# Patient Record
Sex: FEMALE | Race: WHITE | Employment: FULL TIME | URBAN - METROPOLITAN AREA
[De-identification: names, ages, dates, MRNs, and addresses within clinical notes are randomized per-mention and may not be internally consistent; named-entity substitution may affect disease eponyms.]

---

## 2017-06-16 ENCOUNTER — OFFICE VISIT (OUTPATIENT)
Dept: FAMILY MEDICINE CLINIC | Facility: CLINIC | Age: 32
End: 2017-06-16

## 2017-06-16 ENCOUNTER — APPOINTMENT (OUTPATIENT)
Dept: LAB | Age: 32
End: 2017-06-16
Attending: NURSE PRACTITIONER
Payer: COMMERCIAL

## 2017-06-16 VITALS
HEART RATE: 89 BPM | TEMPERATURE: 97 F | WEIGHT: 211.38 LBS | DIASTOLIC BLOOD PRESSURE: 88 MMHG | SYSTOLIC BLOOD PRESSURE: 124 MMHG

## 2017-06-16 DIAGNOSIS — Z12.4 SCREENING FOR MALIGNANT NEOPLASM OF CERVIX: ICD-10-CM

## 2017-06-16 DIAGNOSIS — Z01.411 ENCOUNTER FOR GYNECOLOGICAL EXAMINATION WITH ABNORMAL FINDING: ICD-10-CM

## 2017-06-16 DIAGNOSIS — Z11.51 SPECIAL SCREENING EXAMINATION FOR HUMAN PAPILLOMAVIRUS (HPV): ICD-10-CM

## 2017-06-16 DIAGNOSIS — G62.9 NEUROPATHY: ICD-10-CM

## 2017-06-16 DIAGNOSIS — Z00.00 ROUTINE GENERAL MEDICAL EXAMINATION AT A HEALTH CARE FACILITY: Primary | ICD-10-CM

## 2017-06-16 PROCEDURE — 88175 CYTOPATH C/V AUTO FLUID REDO: CPT | Performed by: NURSE PRACTITIONER

## 2017-06-16 PROCEDURE — 99395 PREV VISIT EST AGE 18-39: CPT | Performed by: NURSE PRACTITIONER

## 2017-06-16 PROCEDURE — 82607 VITAMIN B-12: CPT

## 2017-06-16 PROCEDURE — 36415 COLL VENOUS BLD VENIPUNCTURE: CPT

## 2017-06-16 PROCEDURE — 80053 COMPREHEN METABOLIC PANEL: CPT

## 2017-06-16 RX ORDER — NORETHINDRONE ACETATE AND ETHINYL ESTRADIOL 1MG-20(21)
1 KIT ORAL DAILY
Qty: 1 PACKAGE | Refills: 12 | Status: SHIPPED | OUTPATIENT
Start: 2017-06-16 | End: 2017-07-26 | Stop reason: ALTCHOICE

## 2017-06-16 NOTE — PROGRESS NOTES
HPI:    Patient ID: Trudi Macdonald is a 36year old female. HPI patient presents today for her annual physical.  States that she had been on the birth control pill, but while she was on Rituxan for her ITP her blood pressure went up.   Because she was a Allergic/Immunologic: Negative. Neurological:        See HPI   Hematological:        See HPI   Psychiatric/Behavioral: Negative.                Current Outpatient Prescriptions:  Norethin Ace-Eth Estrad-FE 1-20 MG-MCG Oral Tab Take 1 tablet by mouth dorie Abdominal: Soft. Normal appearance and bowel sounds are normal. She exhibits no mass. There is no hepatosplenomegaly. There is no tenderness. There is no rebound and no guarding. No hernia.    Genitourinary: Vagina normal and uterus normal. No breast swelli Vitamin B12 [E]  ThinPrep PAP Smear    Meds This Visit:  Signed Prescriptions Disp Refills    Norethin Ace-Eth Estrad-FE 1-20 MG-MCG Oral Tab 1 Package 12      Sig: Take 1 tablet by mouth daily.            Imaging & Referrals:  None      Patient Instruction

## 2017-06-17 ENCOUNTER — TELEPHONE (OUTPATIENT)
Dept: FAMILY MEDICINE CLINIC | Facility: CLINIC | Age: 32
End: 2017-06-17

## 2017-06-17 NOTE — TELEPHONE ENCOUNTER
----- Message from CRUZ Mims sent at 6/16/2017 10:24 PM CDT -----  Please notify patient that her blood work is normal.  Follow-up if symptoms persist or increase

## 2017-07-26 ENCOUNTER — TELEPHONE (OUTPATIENT)
Dept: FAMILY MEDICINE CLINIC | Facility: CLINIC | Age: 32
End: 2017-07-26

## 2017-07-26 NOTE — TELEPHONE ENCOUNTER
Patient informed of recommendations. Expressed understanding. Patient states she always takes the bcp at the same exact time everyday nor has she missed any.    Patient infomred of bcp sent to pharmacy

## 2017-07-26 NOTE — TELEPHONE ENCOUNTER
Patient wants to change dosage on her bcp. Patient states she is has been having light spotting all month on her current dose of bcp. Patient states this usually happens and usually needs to change the dose to work.  Patient states she is supposed to start

## 2017-07-26 NOTE — TELEPHONE ENCOUNTER
I can increase the estrogen component of her birth control, however please make sure that she is taking it at the exact same time every day and that she has not missed any or taken any late.   Also if she has had any diarrhea or vomiting that could affect a

## 2018-04-13 ENCOUNTER — OFFICE VISIT (OUTPATIENT)
Dept: FAMILY MEDICINE CLINIC | Facility: CLINIC | Age: 33
End: 2018-04-13

## 2018-04-13 VITALS
HEART RATE: 98 BPM | SYSTOLIC BLOOD PRESSURE: 122 MMHG | TEMPERATURE: 98 F | OXYGEN SATURATION: 98 % | DIASTOLIC BLOOD PRESSURE: 88 MMHG | WEIGHT: 230.63 LBS

## 2018-04-13 DIAGNOSIS — L02.91 ABSCESS: Primary | ICD-10-CM

## 2018-04-13 PROCEDURE — 99213 OFFICE O/P EST LOW 20 MIN: CPT | Performed by: NURSE PRACTITIONER

## 2018-04-13 RX ORDER — SULFAMETHOXAZOLE AND TRIMETHOPRIM 800; 160 MG/1; MG/1
1 TABLET ORAL 2 TIMES DAILY
Qty: 14 TABLET | Refills: 0 | Status: SHIPPED | OUTPATIENT
Start: 2018-04-13 | End: 2018-04-20

## 2018-04-13 NOTE — PROGRESS NOTES
Collin Sandhu is a 29year old female.   Patient presents with:  Bump: near [facility]      HPI:   Complaints of spot to belly on Thursday- noticed a bump - still there yesterday- not any bigger- slight painful- 2-3   Red, denies any history of MRSA in CARDIOVASCULAR: denies complaints   GI: denies complaints       EXAM:   /88 (BP Location: Right arm, Patient Position: Sitting, Cuff Size: adult)   Pulse 98   Temp 98 °F (36.7 °C) (Tympanic)   Wt 230 lb 9.6 oz   LMP 04/04/2018 (Exact Date)   SpO2 9

## 2018-07-26 ENCOUNTER — OFFICE VISIT (OUTPATIENT)
Dept: FAMILY MEDICINE CLINIC | Facility: CLINIC | Age: 33
End: 2018-07-26
Payer: COMMERCIAL

## 2018-07-26 VITALS
HEIGHT: 66 IN | SYSTOLIC BLOOD PRESSURE: 128 MMHG | HEART RATE: 86 BPM | RESPIRATION RATE: 16 BRPM | TEMPERATURE: 97 F | DIASTOLIC BLOOD PRESSURE: 72 MMHG | BODY MASS INDEX: 36.84 KG/M2 | WEIGHT: 229.25 LBS

## 2018-07-26 DIAGNOSIS — S80.869A: Primary | ICD-10-CM
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
## 2018-07-26 PROCEDURE — 99214 OFFICE O/P EST MOD 30 MIN: CPT | Performed by: NURSE PRACTITIONER

## 2018-07-26 NOTE — PROGRESS NOTES
Philip Walden is a 29year old female. Patient presents with: Insect Bite: On both feet      HPI:   Complaints of Monday (7/23) had mosquito bites to ankles and feet- multiple bites  Tuesday- looked like normal mosquito bites- ls itchy- yesterday. Paternal Uncle         Allergies    Cefaclor                    Comment:Other reaction(s): hives  Nsaids                      Comment:Other reaction(s): History of Chronic ITP    REVIEW OF SYSTEMS:   GENERAL HEALTH: feels well otherwise  HEENT: denies comp

## 2018-07-26 NOTE — PATIENT INSTRUCTIONS
You may apply triamcinolone cream to areas for itching, try not to scratch the areas.     Follow-up if rash spreads, or if you have body aches, blood in your urine or stool, abdominal pain, joint pain, etc.

## 2018-09-05 ENCOUNTER — OFFICE VISIT (OUTPATIENT)
Dept: FAMILY MEDICINE CLINIC | Facility: CLINIC | Age: 33
End: 2018-09-05
Payer: COMMERCIAL

## 2018-09-05 VITALS
HEART RATE: 88 BPM | SYSTOLIC BLOOD PRESSURE: 108 MMHG | BODY MASS INDEX: 37.53 KG/M2 | WEIGHT: 228 LBS | TEMPERATURE: 99 F | HEIGHT: 65.5 IN | DIASTOLIC BLOOD PRESSURE: 70 MMHG | OXYGEN SATURATION: 96 %

## 2018-09-05 DIAGNOSIS — Z00.00 ENCOUNTER FOR HEALTH MAINTENANCE EXAMINATION IN ADULT: Primary | ICD-10-CM

## 2018-09-05 PROCEDURE — 99395 PREV VISIT EST AGE 18-39: CPT | Performed by: NURSE PRACTITIONER

## 2018-09-05 NOTE — PROGRESS NOTES
HPI:    Patient ID: Jose F Cobb is a 29year old female. HPI patient is here for her annual physical, denies complaints, feels well overall.   Patient does still see Dr. Melissa Garcia for her ITP–states she has had her platelets checked within the last f canal normal.   Nose: Nose normal.   Mouth/Throat: Oropharynx is clear and moist and mucous membranes are normal. Normal dentition. No oropharyngeal exudate. Eyes: Conjunctivae and lids are normal. Pupils are equal, round, and reactive to light.  Right ey orders of the defined types were placed in this encounter. Meds This Visit:  Signed Prescriptions Disp Refills    Norethindrone-Eth Estradiol (NORTREL 0.5/35, 28,) 0.5-35 MG-MCG Oral Tab 3 Package 3      Sig: Take 1 tablet by mouth daily.            Im

## 2018-09-06 ENCOUNTER — TELEPHONE (OUTPATIENT)
Dept: FAMILY MEDICINE CLINIC | Facility: CLINIC | Age: 33
End: 2018-09-06

## 2018-09-06 NOTE — TELEPHONE ENCOUNTER
new script needed for her nortrel, doseage is wrong, should be for 1.0/35 not for 0.5/35 redo and send to [facility]

## 2018-10-25 ENCOUNTER — IMMUNIZATION (OUTPATIENT)
Dept: FAMILY MEDICINE CLINIC | Facility: CLINIC | Age: 33
End: 2018-10-25
Payer: COMMERCIAL

## 2018-10-25 DIAGNOSIS — Z23 NEED FOR VACCINATION: ICD-10-CM

## 2018-10-25 PROCEDURE — 90471 IMMUNIZATION ADMIN: CPT | Performed by: FAMILY MEDICINE

## 2018-10-25 PROCEDURE — 90686 IIV4 VACC NO PRSV 0.5 ML IM: CPT | Performed by: FAMILY MEDICINE

## 2018-12-11 ENCOUNTER — OFFICE VISIT (OUTPATIENT)
Dept: FAMILY MEDICINE CLINIC | Facility: CLINIC | Age: 33
End: 2018-12-11
Payer: COMMERCIAL

## 2018-12-11 ENCOUNTER — APPOINTMENT (OUTPATIENT)
Dept: LAB | Age: 33
End: 2018-12-11
Attending: NURSE PRACTITIONER
Payer: COMMERCIAL

## 2018-12-11 ENCOUNTER — HOSPITAL ENCOUNTER (OUTPATIENT)
Dept: GENERAL RADIOLOGY | Age: 33
Discharge: HOME | End: 2018-12-11
Attending: NURSE PRACTITIONER
Payer: COMMERCIAL

## 2018-12-11 VITALS
OXYGEN SATURATION: 100 % | BODY MASS INDEX: 37.53 KG/M2 | RESPIRATION RATE: 16 BRPM | DIASTOLIC BLOOD PRESSURE: 86 MMHG | HEIGHT: 65.5 IN | TEMPERATURE: 98 F | WEIGHT: 228 LBS | HEART RATE: 104 BPM | SYSTOLIC BLOOD PRESSURE: 130 MMHG

## 2018-12-11 DIAGNOSIS — R06.02 SHORTNESS OF BREATH: Primary | ICD-10-CM

## 2018-12-11 DIAGNOSIS — R06.02 SHORTNESS OF BREATH: ICD-10-CM

## 2018-12-11 DIAGNOSIS — R53.83 FATIGUE, UNSPECIFIED TYPE: ICD-10-CM

## 2018-12-11 PROCEDURE — 85025 COMPLETE CBC W/AUTO DIFF WBC: CPT | Performed by: NURSE PRACTITIONER

## 2018-12-11 PROCEDURE — 84443 ASSAY THYROID STIM HORMONE: CPT | Performed by: NURSE PRACTITIONER

## 2018-12-11 PROCEDURE — 80053 COMPREHEN METABOLIC PANEL: CPT | Performed by: NURSE PRACTITIONER

## 2018-12-11 PROCEDURE — 71046 X-RAY EXAM CHEST 2 VIEWS: CPT | Performed by: NURSE PRACTITIONER

## 2018-12-11 PROCEDURE — 36415 COLL VENOUS BLD VENIPUNCTURE: CPT | Performed by: NURSE PRACTITIONER

## 2018-12-11 PROCEDURE — 84439 ASSAY OF FREE THYROXINE: CPT | Performed by: NURSE PRACTITIONER

## 2018-12-11 PROCEDURE — 99214 OFFICE O/P EST MOD 30 MIN: CPT | Performed by: NURSE PRACTITIONER

## 2018-12-11 NOTE — PATIENT INSTRUCTIONS
For your stomach avoid caffeine, alcohol, cigarettes, spicy/acidic foods, and peppermint. Also eat small meals, do not eat before bedtime, also avoid ibuprofen/Aleve/aspirin. Start Prilosec OTC once - twice a day.      If not improving follow up for a

## 2018-12-11 NOTE — PROGRESS NOTES
Jeremy Falcon is a 34year old female.   Patient presents with:  Breathing Problem: labored breathing and throat tightness for about a week and half  Cough: couple days  Joint Pain: mostly knees      HPI:   Complaints of throat tightness and constructio amputee-    • Asthma Paternal Uncle         Allergies    Cefaclor                    Comment:Other reaction(s): hives    REVIEW OF SYSTEMS:   GENERAL HEALTH: feels well otherwise  HEENT: denies complaints  SKIN: denies any unusual skin lesions or r ibuprofen/Aleve/aspirin. Start Prilosec OTC once - twice a day.      If not improving follow up for a pulmonary function test.  The morning of the pulmonary function test you should not eat a large meal, have no caffeine, do not use any inhalers, I do n

## 2019-08-12 NOTE — TELEPHONE ENCOUNTER
Your appointments     Date & Time Appointment Department [facility])    Sep 12, 2019  2:45 PM CDT Physical - Established Patient with Soheila Rasheed, [address] [place] [address], [place] ([place])            [facility]

## 2019-11-05 ENCOUNTER — OFFICE VISIT (OUTPATIENT)
Dept: FAMILY MEDICINE CLINIC | Facility: CLINIC | Age: 34
End: 2019-11-05
Payer: COMMERCIAL

## 2019-11-05 VITALS
DIASTOLIC BLOOD PRESSURE: 80 MMHG | HEART RATE: 106 BPM | OXYGEN SATURATION: 98 % | TEMPERATURE: 99 F | SYSTOLIC BLOOD PRESSURE: 136 MMHG | HEIGHT: 65.5 IN | WEIGHT: 224 LBS | BODY MASS INDEX: 36.87 KG/M2

## 2019-11-05 DIAGNOSIS — Z00.00 ENCOUNTER FOR HEALTH MAINTENANCE EXAMINATION IN ADULT: Primary | ICD-10-CM

## 2019-11-05 DIAGNOSIS — Z01.419 ENCOUNTER FOR GYNECOLOGICAL EXAMINATION: ICD-10-CM

## 2019-11-05 PROCEDURE — 88175 CYTOPATH C/V AUTO FLUID REDO: CPT | Performed by: NURSE PRACTITIONER

## 2019-11-05 PROCEDURE — 99395 PREV VISIT EST AGE 18-39: CPT | Performed by: NURSE PRACTITIONER

## 2019-11-05 PROCEDURE — 87624 HPV HI-RISK TYP POOLED RSLT: CPT | Performed by: NURSE PRACTITIONER

## 2019-11-05 NOTE — PATIENT INSTRUCTIONS
Continue on oral contraceptive pill–take the same time every day if you forget one take it as soon as you remember to take the next with regular time. Follow-up for a flu shot as work as planned. check fasting blood work including cholesterol.     Gar

## 2019-11-05 NOTE — PROGRESS NOTES
HPI:    Patient ID: Brandt Aleman is a 34year old female. HPI patient is here today for her annual physical.  States she feels well overall.   Patient has history of ITP–states her platelets were down to 9000 and has been seen a hematologist.  Codey Moreno Nose: Nose normal.   Mouth/Throat: Oropharynx is clear and moist and mucous membranes are normal. Normal dentition. No oropharyngeal exudate. Eyes: Pupils are equal, round, and reactive to light.  Conjunctivae and lids are normal. Right eye exhibits no di Comments: Moves all 4 extremities, normal strength     Lymphadenopathy:     She has no cervical adenopathy. She has no axillary adenopathy. Right: No inguinal and no supraclavicular adenopathy present.         Left: No inguinal and no supracla

## 2019-11-09 ENCOUNTER — TELEPHONE (OUTPATIENT)
Dept: FAMILY MEDICINE CLINIC | Facility: CLINIC | Age: 34
End: 2019-11-09

## 2019-11-09 NOTE — TELEPHONE ENCOUNTER
----- Message from ИРИНА Uribe sent at 11/9/2019  8:15 AM CST -----  Please notify patient that her Pap smear is within normal limits and HPV is negative. Recommend annual physical, will discuss need for Pap at physical next year. Thank you.

## 2019-11-20 ENCOUNTER — TELEPHONE (OUTPATIENT)
Dept: FAMILY MEDICINE CLINIC | Facility: CLINIC | Age: 34
End: 2019-11-20

## 2019-11-20 NOTE — TELEPHONE ENCOUNTER
Please notify patient that her cholesterol is elevated at 273 (should be less than 200) her HDLs (good cholesterol) are good at 74, her LDLs (bad cholesterol) are a little high at 182, her triglycerides are very good at 87.   Would recommend healthy diet hi

## 2019-11-20 NOTE — TELEPHONE ENCOUNTER
Looking for BW results from labs drawn @ [facility] on Monday 11/18        please advise

## 2019-11-20 NOTE — TELEPHONE ENCOUNTER
Informed pt of her cholesterol number. Pt will watch diet and exercise. Pt will have blood work check at next visit. Pt expressed understanding and thanks.

## 2020-12-21 ENCOUNTER — TELEPHONE (OUTPATIENT)
Dept: FAMILY MEDICINE CLINIC | Facility: CLINIC | Age: 35
End: 2020-12-21

## 2020-12-21 NOTE — TELEPHONE ENCOUNTER
Appt changed to a virtual visit due to symptoms.      Future Appointments   Date Time Provider Boni Ade   12/22/2020  4:00 PM ИРИНА Adams [facility]  verbally consents to a doxo

## 2020-12-21 NOTE — TELEPHONE ENCOUNTER
appt. tomorrow, sore throat, has a cough but she says it's dry  Future Appointments   Date Time Provider Boni Booker   12/22/2020  4:00 PM ИРИНА Cai [facility]

## 2021-03-18 ENCOUNTER — TELEPHONE (OUTPATIENT)
Dept: FAMILY MEDICINE CLINIC | Facility: CLINIC | Age: 36
End: 2021-03-18

## 2021-03-18 NOTE — TELEPHONE ENCOUNTER
P   Patient has only seen Richard Suggs NP for health maintenance exams. Kingsley Walls out of office today. She will return on Saturday. Patient states okay to wait. Future appt:     Your appointments     Date & Time Appointment Department [facility])    Apr 12

## 2021-06-08 ENCOUNTER — OFFICE VISIT (OUTPATIENT)
Dept: FAMILY MEDICINE CLINIC | Facility: CLINIC | Age: 36
End: 2021-06-08
Payer: COMMERCIAL

## 2021-06-08 VITALS
RESPIRATION RATE: 16 BRPM | OXYGEN SATURATION: 100 % | BODY MASS INDEX: 36.38 KG/M2 | HEART RATE: 88 BPM | DIASTOLIC BLOOD PRESSURE: 74 MMHG | TEMPERATURE: 98 F | SYSTOLIC BLOOD PRESSURE: 130 MMHG | HEIGHT: 65.5 IN | WEIGHT: 221 LBS

## 2021-06-08 DIAGNOSIS — D69.3 CHRONIC ITP (IDIOPATHIC THROMBOCYTOPENIA) (HCC): ICD-10-CM

## 2021-06-08 DIAGNOSIS — Z00.00 ENCOUNTER FOR HEALTH MAINTENANCE EXAMINATION IN ADULT: Primary | ICD-10-CM

## 2021-06-08 PROCEDURE — 3078F DIAST BP <80 MM HG: CPT | Performed by: NURSE PRACTITIONER

## 2021-06-08 PROCEDURE — 3008F BODY MASS INDEX DOCD: CPT | Performed by: NURSE PRACTITIONER

## 2021-06-08 PROCEDURE — 3075F SYST BP GE 130 - 139MM HG: CPT | Performed by: NURSE PRACTITIONER

## 2021-06-08 PROCEDURE — 99395 PREV VISIT EST AGE 18-39: CPT | Performed by: NURSE PRACTITIONER

## 2021-06-08 NOTE — PATIENT INSTRUCTIONS
Prevnar 13  - in the future     Pap next year     Fasting blood work - to be done at [facility] - call for results. It is important to get enough sleep (at least 7 hrs a night).   Increase EXERCISE, eat a healthy diet (5 fruits and/or vegetables a day

## 2021-06-08 NOTE — PROGRESS NOTES
HPI/Subjective:   Patient ID: Quan Ruiz is a 37year old female. HPI patient presents today for her annual physical exam–patient states she is feeling well overall denies complaints.   Patient has not been on Rituxan since last November for her I Neck:      Thyroid: No thyroid mass or thyromegaly. Trachea: Trachea normal. No tracheal deviation. Cardiovascular:      Rate and Rhythm: Normal rate and regular rhythm. Pulses: Normal pulses.            Dorsalis pedis pulses are 2+ on the rig Visit:  Requested Prescriptions     Signed Prescriptions Disp Refills   • Norethindrone-Eth Estradiol 1-35 MG-MCG Oral Tab 3 each 12     Sig: Take 1 tablet by mouth daily.        Imaging & Referrals:  None   Patient Instructions   Prevnar 13  - in the futur

## 2022-10-24 ENCOUNTER — TELEPHONE (OUTPATIENT)
Dept: FAMILY MEDICINE CLINIC | Facility: CLINIC | Age: 37
End: 2022-10-24

## 2022-10-24 NOTE — TELEPHONE ENCOUNTER
Future appt: Your appointments     Date & Time Appointment Department [facility])    Nov 17, 2022  4:00 PM CST Physical - Established with Keenan Hernandez, [address] [place] [address] ([facility])            [address] [facility]  [id number]  [phone]        Last Appointment with provider:   Visit date not found  Last appointment at [facility]:  Visit date not found    Last px: 6/8/21  Last refill of BCP sent on 6/8/21 for one year    No results found for: CHOLEST, HDL, LDL, TRIGLY, TRIG  No results found for: EAG, A1C  Lab Results   Component Value Date    T4F 1.2 12/11/2018    TSH 0.513 12/11/2018       No follow-ups on file.

## 2022-10-24 NOTE — TELEPHONE ENCOUNTER
pt has px on 11/17- is currently on last BC pill pack, will need another refill sent to [facility] before her appt     Future Appointments   Date Time Provider Boni Booker   11/17/2022  4:00 PM ИРИНА Walton [facility]

## (undated) NOTE — MR AVS SNAPSHOT
[address]  [place] [id number] [phone]               Thank you for choosing us for your health care visit with CRUZ Camejo.   We are glad to serve you and happy to provide you with this summary o Other reaction(s): History of Chronic ITP                Today's Vital Signs     BP Pulse Temp Weight          124/88 mmHg 89 97.3 °F (36.3 °C) (Tympanic) 211 lb 6.4 oz           Current Medications          This list is accurate as of: 6/16/17 12:02 PM. Make half your plate fruits and vegetables Highly refined, white starches including white bread, rice and pasta   Eat plenty of protein, keep the fat content low Sugars:  sodas and sports drinks, candies and desserts   Eat plenty of low-fat dairy products